# Patient Record
Sex: FEMALE | Race: WHITE | NOT HISPANIC OR LATINO | Employment: FULL TIME | URBAN - METROPOLITAN AREA
[De-identification: names, ages, dates, MRNs, and addresses within clinical notes are randomized per-mention and may not be internally consistent; named-entity substitution may affect disease eponyms.]

---

## 2017-02-17 PROBLEM — N76.1 SUBACUTE VAGINITIS: Status: ACTIVE | Noted: 2017-02-17

## 2017-02-17 PROBLEM — R35.0 URINARY FREQUENCY: Status: ACTIVE | Noted: 2017-02-17

## 2020-11-16 ENCOUNTER — TELEPHONE (OUTPATIENT)
Dept: OPHTHALMOLOGY | Facility: CLINIC | Age: 55
End: 2020-11-16

## 2020-11-16 NOTE — TELEPHONE ENCOUNTER
----- Message from Sheila Diaz sent at 11/16/2020  1:49 PM CST -----  Regarding: Records from [facility]  Pt calling to confirm if we received any records from Dr. Mcclain on her behalf. If so , please contact her to schedule and if not notify her so that she can follow up with Dr. Mcclain. She can be reached at [phone]

## 2020-11-18 ENCOUNTER — TELEPHONE (OUTPATIENT)
Dept: OPHTHALMOLOGY | Facility: CLINIC | Age: 55
End: 2020-11-18

## 2020-11-18 NOTE — TELEPHONE ENCOUNTER
----- Message from Matt Valencia sent at 11/18/2020  4:56 PM CST -----  [facility] Staff  Caller: patient (Today,  4:30 PM)         Type: Needs Medical Advice   Who Called:  Patient   Symptoms (please be specific):  left eye is gushing green and yellow discharge, blurred vision   How long has patient had these symptoms:  months   Pharmacy name and phone #:     Best Call Back Number: [phone] (home)   Additional Information: Patient states referral was sent from Dr Sumi Diaz for a second opinion. She needs to know if they have received the records from Dr Diaz and also needs to be scheduled. Please call patient.  Thanks !

## 2020-11-24 ENCOUNTER — OFFICE VISIT (OUTPATIENT)
Dept: OPHTHALMOLOGY | Facility: CLINIC | Age: 55
End: 2020-11-24
Payer: COMMERCIAL

## 2020-11-24 DIAGNOSIS — H16.202 KERATOCONJUNCTIVITIS OF LEFT EYE: Primary | ICD-10-CM

## 2020-11-24 PROCEDURE — 99999 PR PBB SHADOW E&M-EST. PATIENT-LVL III: CPT | Mod: PBBFAC,,, | Performed by: OPHTHALMOLOGY

## 2020-11-24 PROCEDURE — 99999 PR PBB SHADOW E&M-EST. PATIENT-LVL III: ICD-10-PCS | Mod: PBBFAC,,, | Performed by: OPHTHALMOLOGY

## 2020-11-24 PROCEDURE — 92002 INTRM OPH EXAM NEW PATIENT: CPT | Mod: S$GLB,,, | Performed by: OPHTHALMOLOGY

## 2020-11-24 PROCEDURE — 1125F PR PAIN SEVERITY QUANTIFIED, PAIN PRESENT: ICD-10-PCS | Mod: S$GLB,,, | Performed by: OPHTHALMOLOGY

## 2020-11-24 PROCEDURE — 1125F AMNT PAIN NOTED PAIN PRSNT: CPT | Mod: S$GLB,,, | Performed by: OPHTHALMOLOGY

## 2020-11-24 PROCEDURE — 92002 PR EYE EXAM, NEW PATIENT,INTERMED: ICD-10-PCS | Mod: S$GLB,,, | Performed by: OPHTHALMOLOGY

## 2020-11-24 RX ORDER — NEOMYCIN SULFATE, POLYMYXIN B SULFATE, AND DEXAMETHASONE 3.5; 10000; 1 MG/G; [USP'U]/G; MG/G
OINTMENT OPHTHALMIC NIGHTLY
Qty: 1 TUBE | Refills: 1 | Status: SHIPPED | OUTPATIENT
Start: 2020-11-24 | End: 2020-12-08

## 2020-11-24 RX ORDER — OLOPATADINE HYDROCHLORIDE 1 MG/ML
1 SOLUTION/ DROPS OPHTHALMIC 2 TIMES DAILY
COMMUNITY
End: 2021-01-26

## 2020-11-24 RX ORDER — NEOMYCIN SULFATE, POLYMYXIN B SULFATE AND DEXAMETHASONE 3.5; 10000; 1 MG/ML; [USP'U]/ML; MG/ML
SUSPENSION/ DROPS OPHTHALMIC
Qty: 5 ML | Refills: 2 | Status: SHIPPED | OUTPATIENT
Start: 2020-11-24 | End: 2021-01-26 | Stop reason: SDUPTHER

## 2020-11-24 NOTE — PROGRESS NOTES
"HPI     Pt here today for red, itching, crusted in mornings, mucusy discharge OS   only on & off x 2 months.   Has seen outside ophthalmologist, Dr. Mcclain,   diagnosed with shingles & treated with acyclovir.   Went back after   finishing treatment and was told has conjunctivitis with no treatment,   told to let it "run its course".  Went back again after 2 weeks and was   told possibly allergies & treating with Pataday qam, did not use today.     Pt c/o of OS still constantly itching, irritated, tearing throughout day.     +Stabbing pains like "needles in eye".   Concerned that symptoms are   causing blurred VA, like a film over eye.   Has not had any symptoms OD.     Last edited by Roseanna Isaac on 11/24/2020 10:23 AM. (History)            Assessment /Plan     For exam results, see Encounter Report.    Keratoconjunctivitis of left eye      Unclear etiology, papillary conj response with significant keratitis.  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 Mcclain.  Only OS.  Could be allergic vs chronic viral.  Will start with maxitrol taper, may need to consider doxy or azithro course    Start maxitrol gtts OS, 1 month taper  maxitrol theodora qhs x 1 week  Frequent lubricants    F/u 5 weeks                 "

## 2020-11-24 NOTE — LETTER
November 24, 2020      [facility]  [address] [place] [address] [address]  [place] [address]  Phone: [phone]       Patient: Soren Funez   YOB: 1965  Date of Visit: 11/24/2020    To Whom It May Concern:    Dre Funez  was at [facility] on 11/24/2020. She may return to work on 11/24/20 with no restrictions. If you have any questions or concerns, or if I can be of further assistance, please do not hesitate to contact me.    Sincerely,    Leonie Nathan

## 2021-01-25 ENCOUNTER — TELEPHONE (OUTPATIENT)
Dept: OPTOMETRY | Facility: CLINIC | Age: 56
End: 2021-01-25

## 2021-01-26 ENCOUNTER — OFFICE VISIT (OUTPATIENT)
Dept: OPHTHALMOLOGY | Facility: CLINIC | Age: 56
End: 2021-01-26
Payer: COMMERCIAL

## 2021-01-26 ENCOUNTER — PATIENT MESSAGE (OUTPATIENT)
Dept: OPHTHALMOLOGY | Facility: CLINIC | Age: 56
End: 2021-01-26

## 2021-01-26 DIAGNOSIS — H16.202 KERATOCONJUNCTIVITIS OF LEFT EYE: ICD-10-CM

## 2021-01-26 DIAGNOSIS — H10.402 CHRONIC CONJUNCTIVITIS OF LEFT EYE, UNSPECIFIED CHRONIC CONJUNCTIVITIS TYPE: Primary | ICD-10-CM

## 2021-01-26 PROCEDURE — 99213 PR OFFICE/OUTPT VISIT, EST, LEVL III, 20-29 MIN: ICD-10-PCS | Mod: S$GLB,,, | Performed by: OPHTHALMOLOGY

## 2021-01-26 PROCEDURE — 1125F PR PAIN SEVERITY QUANTIFIED, PAIN PRESENT: ICD-10-PCS | Mod: S$GLB,,, | Performed by: OPHTHALMOLOGY

## 2021-01-26 PROCEDURE — 99213 OFFICE O/P EST LOW 20 MIN: CPT | Mod: S$GLB,,, | Performed by: OPHTHALMOLOGY

## 2021-01-26 PROCEDURE — 99999 PR PBB SHADOW E&M-EST. PATIENT-LVL III: ICD-10-PCS | Mod: PBBFAC,,, | Performed by: OPHTHALMOLOGY

## 2021-01-26 PROCEDURE — 99999 PR PBB SHADOW E&M-EST. PATIENT-LVL III: CPT | Mod: PBBFAC,,, | Performed by: OPHTHALMOLOGY

## 2021-01-26 PROCEDURE — 1125F AMNT PAIN NOTED PAIN PRSNT: CPT | Mod: S$GLB,,, | Performed by: OPHTHALMOLOGY

## 2021-01-26 RX ORDER — NEOMYCIN SULFATE, POLYMYXIN B SULFATE AND DEXAMETHASONE 3.5; 10000; 1 MG/ML; [USP'U]/ML; MG/ML
SUSPENSION/ DROPS OPHTHALMIC
Qty: 5 ML | Refills: 2 | Status: SHIPPED | OUTPATIENT
Start: 2021-01-26 | End: 2021-03-02

## 2021-01-26 RX ORDER — DOXYCYCLINE HYCLATE 100 MG
100 TABLET ORAL EVERY 12 HOURS
Qty: 28 TABLET | Refills: 0 | Status: SHIPPED | OUTPATIENT
Start: 2021-01-26 | End: 2021-02-09

## 2021-05-10 ENCOUNTER — PATIENT MESSAGE (OUTPATIENT)
Dept: RESEARCH | Facility: HOSPITAL | Age: 56
End: 2021-05-10